# Patient Record
Sex: MALE | Race: WHITE | NOT HISPANIC OR LATINO | Employment: FULL TIME | URBAN - METROPOLITAN AREA
[De-identification: names, ages, dates, MRNs, and addresses within clinical notes are randomized per-mention and may not be internally consistent; named-entity substitution may affect disease eponyms.]

---

## 2017-12-01 ENCOUNTER — OFFICE VISIT (OUTPATIENT)
Dept: URGENT CARE | Facility: CLINIC | Age: 22
End: 2017-12-01
Payer: COMMERCIAL

## 2017-12-01 PROCEDURE — G0382 LEV 3 HOSP TYPE B ED VISIT: HCPCS

## 2017-12-01 PROCEDURE — 99283 EMERGENCY DEPT VISIT LOW MDM: CPT

## 2017-12-05 NOTE — PROGRESS NOTES
Assessment  1  Tracheobronchitis (490) (J40)    Plan  Tracheobronchitis    · DrRx Phenergan with Codeine 118 ML; 1 tsp every 4 hrs as needed   Disp 4 oz    Discussion/Summary  Discussion Summary:   4 to 6 glasses of water or juice per day  Vaporizer or humidifier the bedroom  Steam treatments with steam coming from the sink are very helpful  Robitussin DM for cough  Understands and agrees with treatment plan: The treatment plan was reviewed with the patient/guardian  The patient/guardian understands and agrees with the treatment plan   Counseling Documentation With Imm: The patient was counseled regarding instructions for management  Chief Complaint    1  Cough  Chief Complaint Free Text Note Form: The patient developed a cough one week ago  He was seen by his PCP and she prescribed cough medication  he reports he still has an occasional cough, most often at night and after activity  History of Present Illness  HPI: See CC   Hospital Based Practices Required Assessment:  Pain Assessment  the patient states they do not have pain  Reason DV Screen not done: family present   Depression And Suicide Screen  Reason suicide screen not done: family present  Review of Systems  Focused-Male:  ENT: as noted in HPI  Respiratory: as noted in HPI  Past Medical History  1  No pertinent past medical history  Active Problems And Past Medical History Reviewed: The active problems and past medical history were reviewed and updated today  Social History     · Never a smoker  Social History Reviewed: The social history was reviewed and updated today  Current Meds   1  Omeprazole 20 MG Oral Capsule Delayed Release; Therapy: (Recorded:[id number]) to Recorded    Allergies    1   Penicillins    Vitals  Signs   Recorded: [id number] 05:54PM   Temperature: 97 6 F  Heart Rate: 98  Respiration: 18  Systolic: 189  Diastolic: 70  Height: 5 ft 8 in  Weight: 285 lb   BMI Calculated: 43 33  BSA Calculated: 2 38  O2 Saturation: 98  Pain Scale: 0    Physical Exam   Eyes  Conjunctiva and lids: No swelling, erythema, or discharge  Ears, Nose, Mouth, and Throat  External inspection of ears and nose: Normal    Otoscopic examination: Tympanic membrance translucent with normal light reflex  Canals patent without erythema  Nasal mucosa, septum, and turbinates: Normal without edema or erythema  Oropharynx: Normal with no erythema, edema, exudate or lesions  Pulmonary  Auscultation of lungs: Clear to auscultation  Cardiovascular  Palpation of heart: Normal PMI, no thrills         Signatures   Electronically signed by : GHAZALA Ritter ; Dec  1 2017  6:13PM EST                       (Author)

## 2018-01-23 VITALS
HEIGHT: 68 IN | WEIGHT: 285 LBS | HEART RATE: 98 BPM | DIASTOLIC BLOOD PRESSURE: 70 MMHG | RESPIRATION RATE: 18 BRPM | SYSTOLIC BLOOD PRESSURE: 126 MMHG | BODY MASS INDEX: 43.19 KG/M2 | OXYGEN SATURATION: 98 % | TEMPERATURE: 97.6 F

## 2022-03-17 ENCOUNTER — HOSPITAL ENCOUNTER (EMERGENCY)
Facility: HOSPITAL | Age: 27
Discharge: HOME | End: 2022-03-17
Attending: EMERGENCY MEDICINE
Payer: COMMERCIAL

## 2022-03-17 ENCOUNTER — APPOINTMENT (EMERGENCY)
Dept: CT IMAGING | Facility: HOSPITAL | Age: 27
End: 2022-03-17
Payer: COMMERCIAL

## 2022-03-17 VITALS
WEIGHT: 315 LBS | HEIGHT: 68 IN | BODY MASS INDEX: 47.74 KG/M2 | TEMPERATURE: 97.8 F | RESPIRATION RATE: 16 BRPM | SYSTOLIC BLOOD PRESSURE: 118 MMHG | DIASTOLIC BLOOD PRESSURE: 61 MMHG | HEART RATE: 71 BPM | OXYGEN SATURATION: 99 %

## 2022-03-17 DIAGNOSIS — R19.7 DIARRHEA: ICD-10-CM

## 2022-03-17 DIAGNOSIS — R10.9 ABDOMINAL PAIN: Primary | ICD-10-CM

## 2022-03-17 LAB
ALBUMIN SERPL BCP-MCNC: 3.4 G/DL (ref 3.5–5)
ALP SERPL-CCNC: 128 U/L (ref 46–116)
ALT SERPL W P-5'-P-CCNC: 18 U/L (ref 12–78)
ANION GAP SERPL CALCULATED.3IONS-SCNC: 6 MMOL/L (ref 4–13)
AST SERPL W P-5'-P-CCNC: 15 U/L (ref 5–45)
BASOPHILS # BLD AUTO: 0.02 THOUSANDS/ΜL (ref 0–0.1)
BASOPHILS NFR BLD AUTO: 0 % (ref 0–1)
BILIRUB SERPL-MCNC: 0.9 MG/DL (ref 0.2–1)
BILIRUB UR QL STRIP: NEGATIVE
BUN SERPL-MCNC: 11 MG/DL (ref 5–25)
CALCIUM ALBUM COR SERPL-MCNC: 8.8 MG/DL (ref 8.3–10.1)
CALCIUM SERPL-MCNC: 8.3 MG/DL (ref 8.3–10.1)
CHLORIDE SERPL-SCNC: 103 MMOL/L (ref 100–108)
CLARITY UR: CLEAR
CO2 SERPL-SCNC: 27 MMOL/L (ref 21–32)
COLOR UR: YELLOW
CREAT SERPL-MCNC: 0.77 MG/DL (ref 0.6–1.3)
EOSINOPHIL # BLD AUTO: 0.14 THOUSAND/ΜL (ref 0–0.61)
EOSINOPHIL NFR BLD AUTO: 2 % (ref 0–6)
ERYTHROCYTE [DISTWIDTH] IN BLOOD BY AUTOMATED COUNT: 13.2 % (ref 11.6–15.1)
GFR SERPL CREATININE-BSD FRML MDRD: 126 ML/MIN/1.73SQ M
GLUCOSE SERPL-MCNC: 92 MG/DL (ref 65–140)
GLUCOSE UR STRIP-MCNC: NEGATIVE MG/DL
HCT VFR BLD AUTO: 42.7 % (ref 36.5–49.3)
HGB BLD-MCNC: 14 G/DL (ref 12–17)
HGB UR QL STRIP.AUTO: NEGATIVE
IMM GRANULOCYTES # BLD AUTO: 0.02 THOUSAND/UL (ref 0–0.2)
IMM GRANULOCYTES NFR BLD AUTO: 0 % (ref 0–2)
KETONES UR STRIP-MCNC: NEGATIVE MG/DL
LEUKOCYTE ESTERASE UR QL STRIP: NEGATIVE
LIPASE SERPL-CCNC: 64 U/L (ref 73–393)
LYMPHOCYTES # BLD AUTO: 1.42 THOUSANDS/ΜL (ref 0.6–4.47)
LYMPHOCYTES NFR BLD AUTO: 23 % (ref 14–44)
MCH RBC QN AUTO: 27 PG (ref 26.8–34.3)
MCHC RBC AUTO-ENTMCNC: 32.8 G/DL (ref 31.4–37.4)
MCV RBC AUTO: 82 FL (ref 82–98)
MONOCYTES # BLD AUTO: 0.59 THOUSAND/ΜL (ref 0.17–1.22)
MONOCYTES NFR BLD AUTO: 10 % (ref 4–12)
NEUTROPHILS # BLD AUTO: 3.87 THOUSANDS/ΜL (ref 1.85–7.62)
NEUTS SEG NFR BLD AUTO: 65 % (ref 43–75)
NITRITE UR QL STRIP: NEGATIVE
NRBC BLD AUTO-RTO: 0 /100 WBCS
PH UR STRIP.AUTO: 6 [PH]
PLATELET # BLD AUTO: 282 THOUSANDS/UL (ref 149–390)
PMV BLD AUTO: 10.7 FL (ref 8.9–12.7)
POTASSIUM SERPL-SCNC: 4 MMOL/L (ref 3.5–5.3)
PROT SERPL-MCNC: 7.2 G/DL (ref 6.4–8.2)
PROT UR STRIP-MCNC: NEGATIVE MG/DL
RBC # BLD AUTO: 5.18 MILLION/UL (ref 3.88–5.62)
SODIUM SERPL-SCNC: 136 MMOL/L (ref 136–145)
SP GR UR STRIP.AUTO: 1.01 (ref 1–1.03)
UROBILINOGEN UR QL STRIP.AUTO: 0.2 E.U./DL
WBC # BLD AUTO: 6.06 THOUSAND/UL (ref 4.31–10.16)

## 2022-03-17 PROCEDURE — 83690 ASSAY OF LIPASE: CPT | Performed by: EMERGENCY MEDICINE

## 2022-03-17 PROCEDURE — 99284 EMERGENCY DEPT VISIT MOD MDM: CPT

## 2022-03-17 PROCEDURE — 80053 COMPREHEN METABOLIC PANEL: CPT | Performed by: EMERGENCY MEDICINE

## 2022-03-17 PROCEDURE — 96374 THER/PROPH/DIAG INJ IV PUSH: CPT

## 2022-03-17 PROCEDURE — 96361 HYDRATE IV INFUSION ADD-ON: CPT

## 2022-03-17 PROCEDURE — 36415 COLL VENOUS BLD VENIPUNCTURE: CPT

## 2022-03-17 PROCEDURE — 81003 URINALYSIS AUTO W/O SCOPE: CPT | Performed by: PHYSICIAN ASSISTANT

## 2022-03-17 PROCEDURE — 85025 COMPLETE CBC W/AUTO DIFF WBC: CPT | Performed by: EMERGENCY MEDICINE

## 2022-03-17 PROCEDURE — 74177 CT ABD & PELVIS W/CONTRAST: CPT

## 2022-03-17 PROCEDURE — 99284 EMERGENCY DEPT VISIT MOD MDM: CPT | Performed by: PHYSICIAN ASSISTANT

## 2022-03-17 RX ORDER — KETOROLAC TROMETHAMINE 30 MG/ML
30 INJECTION, SOLUTION INTRAMUSCULAR; INTRAVENOUS ONCE
Status: COMPLETED | OUTPATIENT
Start: 2022-03-17 | End: 2022-03-17

## 2022-03-17 RX ORDER — NAPROXEN 500 MG/1
500 TABLET ORAL
COMMUNITY
Start: 2022-02-24

## 2022-03-17 RX ADMIN — SODIUM CHLORIDE 1000 ML: 0.9 INJECTION, SOLUTION INTRAVENOUS at 17:25

## 2022-03-17 RX ADMIN — KETOROLAC TROMETHAMINE 30 MG: 30 INJECTION, SOLUTION INTRAMUSCULAR at 18:54

## 2022-03-17 RX ADMIN — IOHEXOL 100 ML: 350 INJECTION, SOLUTION INTRAVENOUS at 17:30

## 2022-03-17 NOTE — ED PROVIDER NOTES
History  Chief Complaint   Patient presents with    Abdominal Pain     Pt reports abd pain since yesterday, +diarrhea since yesterday, has not taken any medications prior to arrival, denies n/v  Denies trauma  Patient is a 44-year-old white male with history of abdominal  hernia repair November 2021 who reports onset yesterday of abdominal pain, diarrhea, decreased appetite  No fever  No nausea or vomiting  No urinary symptoms  No back pain or flank pain  No recent sick contacts, foreign travel, antibiotics  No other complaints  Prior to Admission Medications   Prescriptions Last Dose Informant Patient Reported? Taking? Omeprazole 20 MG TBDD   Yes No   Sig: Take 20 mg by mouth   naproxen (NAPROSYN) 500 mg tablet   Yes Yes   Sig: Take 500 mg by mouth      Facility-Administered Medications: None       History reviewed  No pertinent past medical history  Past Surgical History:   Procedure Laterality Date    HERNIA REPAIR         History reviewed  No pertinent family history  I have reviewed and agree with the history as documented  E-Cigarette/Vaping    E-Cigarette Use Never User      E-Cigarette/Vaping Substances    Nicotine No     THC No     CBD No     Flavoring No     Other No     Unknown No      Social History     Tobacco Use    Smoking status: Former Smoker    Smokeless tobacco: Never Used   Vaping Use    Vaping Use: Never used   Substance Use Topics    Alcohol use: Yes     Comment: rare    Drug use: Never       Review of Systems   Constitutional: Negative for chills and fever  HENT: Negative for sore throat  Respiratory: Negative for cough and shortness of breath  Cardiovascular: Negative for chest pain and palpitations  Gastrointestinal: Positive for abdominal pain and diarrhea  Negative for constipation, nausea and vomiting  Genitourinary: Negative for dysuria and hematuria  Musculoskeletal: Negative for arthralgias, back pain and neck pain     Skin: Negative for rash  Neurological: Negative for syncope and headaches  All other systems reviewed and are negative  Physical Exam  Physical Exam  Vitals and nursing note reviewed  Constitutional:       General: He is not in acute distress  Appearance: He is well-developed  He is obese  He is not ill-appearing, toxic-appearing or diaphoretic  HENT:      Head: Normocephalic and atraumatic  Mouth/Throat:      Mouth: Mucous membranes are moist       Pharynx: Oropharynx is clear  Eyes:      Extraocular Movements: Extraocular movements intact  Pupils: Pupils are equal, round, and reactive to light  Cardiovascular:      Rate and Rhythm: Normal rate and regular rhythm  Heart sounds: Normal heart sounds  Pulmonary:      Effort: Pulmonary effort is normal       Breath sounds: Normal breath sounds  Abdominal:      General: Bowel sounds are normal       Palpations: Abdomen is soft  Comments: No umbilical swelling, redness, drainage  Tenderness to deep palpation epigastrium to left mid abdomen  Negative Noguera sign  No tenderness over McBurney's point  Skin:     General: Skin is warm and dry  Capillary Refill: Capillary refill takes less than 2 seconds  Neurological:      Mental Status: He is alert           Vital Signs  ED Triage Vitals [03/17/22 1215]   Temperature Pulse Respirations Blood Pressure SpO2   97 8 °F (36 6 °C) 68 16 114/59 100 %      Temp Source Heart Rate Source Patient Position - Orthostatic VS BP Location FiO2 (%)   Temporal Monitor Sitting Left arm --      Pain Score       8           Vitals:    03/17/22 1215 03/17/22 1359 03/17/22 1624   BP: 114/59 110/55 118/61   Pulse: 68 66 71   Patient Position - Orthostatic VS: Sitting Sitting Sitting         Visual Acuity      ED Medications  Medications   sodium chloride 0 9 % bolus 1,000 mL (0 mL Intravenous Stopped 3/17/22 1825)   iohexol (OMNIPAQUE) 350 MG/ML injection (SINGLE-DOSE) 100 mL (100 mL Intravenous Given 3/17/22 1730)   ketorolac (TORADOL) injection 30 mg (30 mg Intravenous Given 3/17/22 1854)       Diagnostic Studies  Results Reviewed     Procedure Component Value Units Date/Time    UA w Reflex to Microscopic w Reflex to Culture [[id number]] Collected: 03/17/22 1854    Lab Status: Final result Specimen: Urine, Clean Catch Updated: 03/17/22 1901     Color, UA Yellow     Clarity, UA Clear     Specific Gravity, UA 1 010     pH, UA 6 0     Leukocytes, UA Negative     Nitrite, UA Negative     Protein, UA Negative mg/dl      Glucose, UA Negative mg/dl      Ketones, UA Negative mg/dl      Urobilinogen, UA 0 2 E U /dl      Bilirubin, UA Negative     Blood, UA Negative    Comprehensive metabolic panel [[id number]]  (Abnormal) Collected: 03/17/22 1234    Lab Status: Final result Specimen: Blood from Arm, Right Updated: 03/17/22 1331     Sodium 136 mmol/L      Potassium 4 0 mmol/L      Chloride 103 mmol/L      CO2 27 mmol/L      ANION GAP 6 mmol/L      BUN 11 mg/dL      Creatinine 0 77 mg/dL      Glucose 92 mg/dL      Calcium 8 3 mg/dL      Corrected Calcium 8 8 mg/dL      AST 15 U/L      ALT 18 U/L      Alkaline Phosphatase 128 U/L      Total Protein 7 2 g/dL      Albumin 3 4 g/dL      Total Bilirubin 0 90 mg/dL      eGFR 126 ml/min/1 73sq m     Narrative:      [facility] guidelines for Chronic Kidney Disease (CKD):     Stage 1 with normal or high GFR (GFR > 90 mL/min/1 73 square meters)    Stage 2 Mild CKD (GFR = 60-89 mL/min/1 73 square meters)    Stage 3A Moderate CKD (GFR = 45-59 mL/min/1 73 square meters)    Stage 3B Moderate CKD (GFR = 30-44 mL/min/1 73 square meters)    Stage 4 Severe CKD (GFR = 15-29 mL/min/1 73 square meters)    Stage 5 End Stage CKD (GFR <15 mL/min/1 73 square meters)  Note: GFR calculation is accurate only with a steady state creatinine    Lipase [[id number]]  (Abnormal) Collected: 03/17/22 1234    Lab Status: Final result Specimen: Blood from Arm, Right Updated: 03/17/22 1331     Lipase 64 u/L     CBC and differential [[id number]] Collected: 03/17/22 1234    Lab Status: Final result Specimen: Blood from Arm, Right Updated: 03/17/22 1242     WBC 6 06 Thousand/uL      RBC 5 18 Million/uL      Hemoglobin 14 0 g/dL      Hematocrit 42 7 %      MCV 82 fL      MCH 27 0 pg      MCHC 32 8 g/dL      RDW 13 2 %      MPV 10 7 fL      Platelets 330 Thousands/uL      nRBC 0 /100 WBCs      Neutrophils Relative 65 %      Immat GRANS % 0 %      Lymphocytes Relative 23 %      Monocytes Relative 10 %      Eosinophils Relative 2 %      Basophils Relative 0 %      Neutrophils Absolute 3 87 Thousands/µL      Immature Grans Absolute 0 02 Thousand/uL      Lymphocytes Absolute 1 42 Thousands/µL      Monocytes Absolute 0 59 Thousand/µL      Eosinophils Absolute 0 14 Thousand/µL      Basophils Absolute 0 02 Thousands/µL                  CT abdomen pelvis with contrast   Final Result by Geri Mcginnis DO (03/17 1820)   Nonspecific hepatosplenomegaly  No CT evidence for colitis or diverticulitis  Prominent but not pathologically enlarged mesenteric nodes can be seen in the setting of enteritis  Workstation performed: [id number]                    Procedures  Procedures         ED Course                               SBIRT 20yo+      Most Recent Value   SBIRT (22 yo +)    In order to provide better care to our patients, we are screening all of our patients for alcohol and drug use  Would it be okay to ask you these screening questions? Yes Filed at: 03/17/2022 1233   Initial Alcohol Screen: US AUDIT-C     1  How often do you have a drink containing alcohol? 1 Filed at: 03/17/2022 1233   2  How many drinks containing alcohol do you have on a typical day you are drinking? 1 Filed at: 03/17/2022 1233   3a  Male UNDER 65: How often do you have five or more drinks on one occasion? 0 Filed at: 03/17/2022 1233   3b  FEMALE Any Age, or MALE 65+:  How often do you have 4 or more drinks on one occassion? 0 Filed at: 03/17/2022 1233   Audit-C Score 2 Filed at: 03/17/2022 1233   BRIONNA: How many times in the past year have you    Used an illegal drug or used a prescription medication for non-medical reasons? Never Filed at: 03/17/2022 1233                    [facility]  Number of Diagnoses or Management Options  Abdominal pain: new and requires workup  Diarrhea: new and requires workup  Diagnosis management comments: 29-year-old white male with 1 day history of abdominal pain and diarrhea  Blood work is normal   No acute cause found on CT scan  Abdominal exam is benign at time of discharge  Suspect enteritis likely from viral etiology  Amount and/or Complexity of Data Reviewed  Clinical lab tests: reviewed  Tests in the radiology section of CPT®: reviewed  Tests in the medicine section of CPT®: reviewed  Decide to obtain previous medical records or to obtain history from someone other than the patient: yes  Review and summarize past medical records: yes  Independent visualization of images, tracings, or specimens: yes    Risk of Complications, Morbidity, and/or Mortality  Presenting problems: moderate  Diagnostic procedures: moderate  Management options: moderate    Patient Progress  Patient progress: stable      Disposition  Final diagnoses:   Abdominal pain   Diarrhea     Time reflects when diagnosis was documented in both MDM as applicable and the Disposition within this note     Time User Action Codes Description Comment    3/17/2022  7:07 PM Thresea Quails Add [R10 9] Abdominal pain     3/17/2022  7:07 PM Thresea Quails Add [R19 7] Diarrhea       ED Disposition     ED Disposition Condition Date/Time Comment    Discharge Stable Thu Mar 17, 2022  7:07 PM [employer] [facility] discharge to home/self care              Follow-up Information     Follow up With Specialties Details Why Contact Info    Bryon Crane MD    [address]  [facility] [address]  [phone] Patient's Medications   Discharge Prescriptions    No medications on file       No discharge procedures on file      PDMP Review     None          ED Provider  Electronically Signed by           Akanksha Zaidi PA-C  03/17/22 [address] [place], [place] [address]JEANINE  03/17/22 1675

## 2022-03-17 NOTE — DISCHARGE INSTRUCTIONS
Encourage fluids  May take Imodium over-the-counter for diarrhea  Follow-up with your primary care provider next 2-3 days for recheck if no improvement      There was was no acute cause for your abdominal pain found on CT scan    Return to the ED for high fever, worsening pain or symptoms

## 2022-11-04 ENCOUNTER — HOSPITAL ENCOUNTER (EMERGENCY)
Facility: HOSPITAL | Age: 27
Discharge: HOME | End: 2022-11-05
Attending: EMERGENCY MEDICINE | Admitting: EMERGENCY MEDICINE

## 2022-11-04 DIAGNOSIS — R10.9 ABDOMINAL PAIN: Primary | ICD-10-CM

## 2022-11-04 DIAGNOSIS — I88.0 MESENTERIC ADENITIS: ICD-10-CM

## 2022-11-04 LAB
ALBUMIN SERPL BCP-MCNC: 3.7 G/DL (ref 3.5–5)
ALP SERPL-CCNC: 136 U/L (ref 46–116)
ALT SERPL W P-5'-P-CCNC: 24 U/L (ref 12–78)
ANION GAP SERPL CALCULATED.3IONS-SCNC: 8 MMOL/L (ref 4–13)
AST SERPL W P-5'-P-CCNC: 24 U/L (ref 5–45)
BACTERIA UR QL AUTO: ABNORMAL /HPF
BASOPHILS # BLD AUTO: 0.05 THOUSANDS/ÂΜL (ref 0–0.1)
BASOPHILS NFR BLD AUTO: 1 % (ref 0–1)
BILIRUB SERPL-MCNC: 1.2 MG/DL (ref 0.2–1)
BILIRUB UR QL STRIP: NEGATIVE
BUN SERPL-MCNC: 14 MG/DL (ref 5–25)
CALCIUM SERPL-MCNC: 8.8 MG/DL (ref 8.3–10.1)
CHLORIDE SERPL-SCNC: 104 MMOL/L (ref 96–108)
CLARITY UR: CLEAR
CO2 SERPL-SCNC: 27 MMOL/L (ref 21–32)
COLOR UR: YELLOW
CREAT SERPL-MCNC: 0.97 MG/DL (ref 0.6–1.3)
EOSINOPHIL # BLD AUTO: 0.21 THOUSAND/ÂΜL (ref 0–0.61)
EOSINOPHIL NFR BLD AUTO: 3 % (ref 0–6)
ERYTHROCYTE [DISTWIDTH] IN BLOOD BY AUTOMATED COUNT: 13 % (ref 11.6–15.1)
GFR SERPL CREATININE-BSD FRML MDRD: 108 ML/MIN/1.73SQ M
GLUCOSE SERPL-MCNC: 94 MG/DL (ref 65–140)
GLUCOSE UR STRIP-MCNC: NEGATIVE MG/DL
HCT VFR BLD AUTO: 42.4 % (ref 36.5–49.3)
HGB BLD-MCNC: 13.8 G/DL (ref 12–17)
HGB UR QL STRIP.AUTO: NEGATIVE
IMM GRANULOCYTES # BLD AUTO: 0.02 THOUSAND/UL (ref 0–0.2)
IMM GRANULOCYTES NFR BLD AUTO: 0 % (ref 0–2)
KETONES UR STRIP-MCNC: NEGATIVE MG/DL
LEUKOCYTE ESTERASE UR QL STRIP: ABNORMAL
LIPASE SERPL-CCNC: 76 U/L (ref 73–393)
LYMPHOCYTES # BLD AUTO: 2.11 THOUSANDS/ÂΜL (ref 0.6–4.47)
LYMPHOCYTES NFR BLD AUTO: 30 % (ref 14–44)
MCH RBC QN AUTO: 27.4 PG (ref 26.8–34.3)
MCHC RBC AUTO-ENTMCNC: 32.5 G/DL (ref 31.4–37.4)
MCV RBC AUTO: 84 FL (ref 82–98)
MONOCYTES # BLD AUTO: 0.71 THOUSAND/ÂΜL (ref 0.17–1.22)
MONOCYTES NFR BLD AUTO: 10 % (ref 4–12)
MUCOUS THREADS UR QL AUTO: ABNORMAL
NEUTROPHILS # BLD AUTO: 4 THOUSANDS/ÂΜL (ref 1.85–7.62)
NEUTS SEG NFR BLD AUTO: 56 % (ref 43–75)
NITRITE UR QL STRIP: NEGATIVE
NON-SQ EPI CELLS URNS QL MICRO: ABNORMAL /HPF
NRBC BLD AUTO-RTO: 0 /100 WBCS
PH UR STRIP.AUTO: 6 [PH]
PLATELET # BLD AUTO: 323 THOUSANDS/UL (ref 149–390)
PMV BLD AUTO: 10.6 FL (ref 8.9–12.7)
POTASSIUM SERPL-SCNC: 3.8 MMOL/L (ref 3.5–5.3)
PROT SERPL-MCNC: 8.1 G/DL (ref 6.4–8.4)
PROT UR STRIP-MCNC: ABNORMAL MG/DL
RBC # BLD AUTO: 5.03 MILLION/UL (ref 3.88–5.62)
RBC #/AREA URNS AUTO: ABNORMAL /HPF
SODIUM SERPL-SCNC: 139 MMOL/L (ref 135–147)
SP GR UR STRIP.AUTO: >=1.03 (ref 1–1.03)
UROBILINOGEN UR STRIP-ACNC: 4 MG/DL
WBC # BLD AUTO: 7.1 THOUSAND/UL (ref 4.31–10.16)
WBC #/AREA URNS AUTO: ABNORMAL /HPF

## 2022-11-04 RX ADMIN — SODIUM CHLORIDE 80 MG: 9 INJECTION, SOLUTION INTRAVENOUS at 23:38

## 2022-11-05 ENCOUNTER — APPOINTMENT (EMERGENCY)
Dept: CT IMAGING | Facility: HOSPITAL | Age: 27
End: 2022-11-05

## 2022-11-05 VITALS
SYSTOLIC BLOOD PRESSURE: 105 MMHG | OXYGEN SATURATION: 93 % | TEMPERATURE: 97.5 F | WEIGHT: 315 LBS | DIASTOLIC BLOOD PRESSURE: 63 MMHG | HEART RATE: 69 BPM | RESPIRATION RATE: 20 BRPM | BODY MASS INDEX: 47.74 KG/M2 | HEIGHT: 68 IN

## 2022-11-05 LAB
FLUAV RNA RESP QL NAA+PROBE: NEGATIVE
FLUBV RNA RESP QL NAA+PROBE: NEGATIVE
RSV RNA RESP QL NAA+PROBE: NEGATIVE
SARS-COV-2 RNA RESP QL NAA+PROBE: NEGATIVE

## 2022-11-05 RX ORDER — KETOROLAC TROMETHAMINE 30 MG/ML
15 INJECTION, SOLUTION INTRAMUSCULAR; INTRAVENOUS ONCE
Status: COMPLETED | OUTPATIENT
Start: 2022-11-05 | End: 2022-11-05

## 2022-11-05 RX ADMIN — IOHEXOL 100 ML: 350 INJECTION, SOLUTION INTRAVENOUS at 00:45

## 2022-11-05 RX ADMIN — KETOROLAC TROMETHAMINE 15 MG: 30 INJECTION, SOLUTION INTRAMUSCULAR; INTRAVENOUS at 00:03

## 2022-11-05 NOTE — ED PROVIDER NOTES
History  Chief Complaint   Patient presents with   • Abdominal Pain     Pt states that he started with abd pain on Tuesday  Pt denies taking any OTC meds for the pain  Pt states that he ate pizza tonight and the pain came back  Pt denies N/V/D       24year old male presents for evaluation of epigastric abdominal pain which began at 6:30 pm today after eating pizza  Pain is epigastric, sharp and severe  No exacerbating or alleviating factors  He has not taken anything for pain prior to arrival  Patient states that he had similar symptoms 4 days ago associated with diarrhea, myalgias and chills  Mild cough  No fevers  He took otc medication for diarrhea with resolution of symptoms prior to onset of symptoms today  Sick contacts at home  Normal bowel movement 4 hours ago  History of prior hernia repairs  Patient has chronic back pain for which he takes naproxen daily  History provided by:  Patient  Abdominal Pain  Pain location:  Epigastric  Pain quality: sharp    Pain radiates to:  Does not radiate  Pain severity:  Severe  Onset quality:  Sudden  Duration:  5 days  Timing:  Constant  Progression:  Unchanged  Chronicity:  New  Relieved by:  None tried  Worsened by:  Nothing  Ineffective treatments:  None tried  Associated symptoms: cough    Associated symptoms: no chest pain, no chills, no constipation, no diarrhea (4 days ago, resolved), no dysuria, no fever, no nausea, no shortness of breath and no vomiting    Cough:     Cough characteristics:  Non-productive    Severity:  Mild  Risk factors: NSAID use    Risk factors: has not had multiple surgeries (prior hernia repair)        Prior to Admission Medications   Prescriptions Last Dose Informant Patient Reported? Taking?    Omeprazole 20 MG TBDD   Yes No   Sig: Take 20 mg by mouth   Omeprazole 20 MG TBDD   No Yes   Sig: Take 20 mg by mouth in the morning   naproxen (NAPROSYN) 500 mg tablet   Yes No   Sig: Take 500 mg by mouth      Facility-Administered Medications: None       Past Medical History:   Diagnosis Date   • Hernia, abdominal    • Sleep apnea        Past Surgical History:   Procedure Laterality Date   • HERNIA REPAIR         No family history on file  I have reviewed and agree with the history as documented  E-Cigarette/Vaping   • E-Cigarette Use Never User      E-Cigarette/Vaping Substances   • Nicotine No    • THC No    • CBD No    • Flavoring No    • Other No    • Unknown No      Social History     Tobacco Use   • Smoking status: Former Smoker     Types: Cigarettes   • Smokeless tobacco: Never Used   Vaping Use   • Vaping Use: Never used   Substance Use Topics   • Alcohol use: Yes     Comment: rare   • Drug use: Never       Review of Systems   Constitutional: Negative for chills and fever  HENT: Negative for congestion  Respiratory: Positive for cough  Negative for shortness of breath  Cardiovascular: Negative for chest pain  Gastrointestinal: Positive for abdominal pain  Negative for constipation, diarrhea (4 days ago, resolved), nausea and vomiting  Genitourinary: Negative for dysuria and frequency  Musculoskeletal: Positive for gait problem (chronic, unchanged from baseline) and myalgias  Skin: Negative for rash and wound  Neurological: Negative for headaches  All other systems reviewed and are negative  Physical Exam  Physical Exam  Vitals and nursing note reviewed  Constitutional:       General: He is not in acute distress  Appearance: He is well-developed  He is not toxic-appearing or diaphoretic  HENT:      Head: Normocephalic and atraumatic  Right Ear: External ear normal       Left Ear: External ear normal       Nose: Nose normal    Eyes:      General: No scleral icterus  Cardiovascular:      Rate and Rhythm: Normal rate and regular rhythm  Heart sounds: Normal heart sounds  Pulmonary:      Effort: Pulmonary effort is normal  No respiratory distress  Breath sounds: Normal breath sounds  Abdominal:      General: There is no distension  Palpations: Abdomen is soft  Tenderness: There is abdominal tenderness in the right lower quadrant and epigastric area  There is no guarding or rebound  Musculoskeletal:         General: No deformity  Normal range of motion  Skin:     Findings: No rash  Neurological:      General: No focal deficit present  Mental Status: He is alert  Gait: Gait normal    Psychiatric:         Mood and Affect: Mood normal          Vital Signs  ED Triage Vitals [11/04/22 2155]   Temperature Pulse Respirations Blood Pressure SpO2   97 5 °F (36 4 °C) 77 20 124/71 97 %      Temp Source Heart Rate Source Patient Position - Orthostatic VS BP Location FiO2 (%)   Temporal -- -- -- --      Pain Score       --           Vitals:    11/04/22 2155 11/05/22 0000 11/05/22 0030   BP: 124/71 110/51 105/63   Pulse: 77 81 69         Visual Acuity      ED Medications  Medications   pantoprazole (PROTONIX) 80 mg in sodium chloride 0 9 % 100 mL IVPB (0 mg Intravenous Stopped 11/5/22 0004)   ketorolac (TORADOL) injection 15 mg (15 mg Intravenous Given 11/5/22 0003)   iohexol (OMNIPAQUE) 350 MG/ML injection (SINGLE-DOSE) 100 mL (100 mL Intravenous Given 11/5/22 0045)       Diagnostic Studies  Results Reviewed     Procedure Component Value Units Date/Time    FLU/RSV/COVID - if FLU/RSV clinically relevant [[id number]]  (Normal) Collected: 11/04/22 2341    Lab Status: Final result Specimen: Nares from Nose Updated: 11/05/22 0021     SARS-CoV-2 Negative     INFLUENZA A PCR Negative     INFLUENZA B PCR Negative     RSV PCR Negative    Narrative:      FOR PEDIATRIC PATIENTS - copy/paste COVID Guidelines URL to browser: [email or web address]  [email or web address]    SARS-CoV-2 assay is a Nucleic Acid Amplification assay intended for the  qualitative detection of nucleic acid from SARS-CoV-2 in nasopharyngeal  swabs   Results are for the presumptive identification of SARS-CoV-2 RNA  Positive results are indicative of infection with SARS-CoV-2, the virus  causing COVID-19, but do not rule out bacterial infection or co-infection  with other viruses  Laboratories within the [place] and its  territories are required to report all positive results to the appropriate  public health authorities  Negative results do not preclude SARS-CoV-2  infection and should not be used as the sole basis for treatment or other  patient management decisions  Negative results must be combined with  clinical observations, patient history, and epidemiological information  This test has not been FDA cleared or approved  This test has been authorized by FDA under an Emergency Use Authorization  (EUA)  This test is only authorized for the duration of time the  declaration that circumstances exist justifying the authorization of the  emergency use of an in vitro diagnostic tests for detection of SARS-CoV-2  virus and/or diagnosis of COVID-19 infection under section 564(b)(1) of  the Act, 21 U  S C  190LUQ-5(D)(1), unless the authorization is terminated  or revoked sooner  The test has been validated but independent review by FDA  and CLIA is pending  Test performed using [employer] GeneXpert: This RT-PCR assay targets N2,  a region unique to SARS-CoV-2  A conserved region in the E-gene was chosen  for pan-Sarbecovirus detection which includes SARS-CoV-2  According to CMS-2020-01-R, this platform meets the definition of high-throughput technology  Urine Microscopic [[id number]]  (Abnormal) Collected: 11/04/22 2203    Lab Status: Final result Specimen: Urine, Clean Catch Updated: 11/04/22 2233     RBC, UA 0-1 /hpf      WBC, UA 10-20 /hpf      Epithelial Cells Occasional /hpf      Bacteria, UA None Seen /hpf      MUCUS THREADS Occasional    Urine culture [[id number]] Collected: 11/04/22 2203    Lab Status:  In process Specimen: Urine, Clean Catch Updated: 11/04/22 2233    UA w Reflex to Microscopic w Reflex to Culture [[id number]]  (Abnormal) Collected: 11/04/22 2203    Lab Status: Final result Specimen: Urine, Clean Catch Updated: 11/04/22 2225     Color, UA Yellow     Clarity, UA Clear     Specific Gravity, UA >=1 030     pH, UA 6 0     Leukocytes, UA Trace     Nitrite, UA Negative     Protein, UA Trace mg/dl      Glucose, UA Negative mg/dl      Ketones, UA Negative mg/dl      Urobilinogen, UA 4 0 mg/dl      Bilirubin, UA Negative     Occult Blood, UA Negative    Comprehensive metabolic panel [[id number]]  (Abnormal) Collected: 11/04/22 2159    Lab Status: Final result Specimen: Blood from Arm, Right Updated: 11/04/22 2221     Sodium 139 mmol/L      Potassium 3 8 mmol/L      Chloride 104 mmol/L      CO2 27 mmol/L      ANION GAP 8 mmol/L      BUN 14 mg/dL      Creatinine 0 97 mg/dL      Glucose 94 mg/dL      Calcium 8 8 mg/dL      AST 24 U/L      ALT 24 U/L      Alkaline Phosphatase 136 U/L      Total Protein 8 1 g/dL      Albumin 3 7 g/dL      Total Bilirubin 1 20 mg/dL      eGFR 108 ml/min/1 73sq m     Narrative:      [place] guidelines for Chronic Kidney Disease (CKD):   •  Stage 1 with normal or high GFR (GFR > 90 mL/min/1 73 square meters)  •  Stage 2 Mild CKD (GFR = 60-89 mL/min/1 73 square meters)  •  Stage 3A Moderate CKD (GFR = 45-59 mL/min/1 73 square meters)  •  Stage 3B Moderate CKD (GFR = 30-44 mL/min/1 73 square meters)  •  Stage 4 Severe CKD (GFR = 15-29 mL/min/1 73 square meters)  •  Stage 5 End Stage CKD (GFR <15 mL/min/1 73 square meters)  Note: GFR calculation is accurate only with a steady state creatinine    Lipase [[id number]]  (Normal) Collected: 11/04/22 2159    Lab Status: Final result Specimen: Blood from Arm, Right Updated: 11/04/22 2221     Lipase 76 u/L     CBC and differential [[id number]] Collected: 11/04/22 2159    Lab Status: Final result Specimen: Blood from Arm, Right Updated: 11/04/22 2208     WBC 7 10 Thousand/uL      RBC 5 03 Million/uL      Hemoglobin 13 8 g/dL      Hematocrit 42 4 %      MCV 84 fL      MCH 27 4 pg      MCHC 32 5 g/dL      RDW 13 0 %      MPV 10 6 fL      Platelets 545 Thousands/uL      nRBC 0 /100 WBCs      Neutrophils Relative 56 %      Immat GRANS % 0 %      Lymphocytes Relative 30 %      Monocytes Relative 10 %      Eosinophils Relative 3 %      Basophils Relative 1 %      Neutrophils Absolute 4 00 Thousands/µL      Immature Grans Absolute 0 02 Thousand/uL      Lymphocytes Absolute 2 11 Thousands/µL      Monocytes Absolute 0 71 Thousand/µL      Eosinophils Absolute 0 21 Thousand/µL      Basophils Absolute 0 05 Thousands/µL                  CT abdomen pelvis with contrast   Final Result by Mary Burger MD (11/05 0149)      1  No evidence of appendicitis  2   Small mesenteric lymph nodes are increased in number, nonspecific however may be seen in setting of mesenteric adenitis  Workstation performed: [id number]                    Procedures  Procedures         ED Course                               SBIRT 22yo+    Flowsheet Row Most Recent Value   SBIRT (25 yo +)    In order to provide better care to our patients, we are screening all of our patients for alcohol and drug use  Would it be okay to ask you these screening questions? Yes Filed at: 11/05/2022 0009   Initial Alcohol Screen: US AUDIT-C     1  How often do you have a drink containing alcohol? 1 Filed at: 11/05/2022 0009   2  How many drinks containing alcohol do you have on a typical day you are drinking? 0 Filed at: 11/05/2022 0009   3a  Male UNDER 65: How often do you have five or more drinks on one occasion? 0 Filed at: 11/05/2022 0009   3b  FEMALE Any Age, or MALE 65+: How often do you have 4 or more drinks on one occassion? 0 Filed at: 11/05/2022 0009   Audit-C Score 1 Filed at: 11/05/2022 0009   BRIONNA: How many times in the past year have you    Used an illegal drug or used a prescription medication for non-medical reasons?  Never Annita So at: 11/05/2022 0009                    MDM  Number of Diagnoses or Management Options  Abdominal pain: new and requires workup  Mesenteric adenitis: new and requires workup  Diagnosis management comments: 24year old male presents for evaluation of abdominal pain  Pyuria on UA with no symptoms of UTI  RLQ tenderness on exam  Labs unremarkable  CT abd/pelvis ordered which is consistent with mesenteric adenitis  NSAIDs  Patient's omeprazole re-prescribed as suspect element of gastritis  PCP follow up  Return precautions provided  Amount and/or Complexity of Data Reviewed  Clinical lab tests: ordered and reviewed  Tests in the radiology section of CPT®: ordered and reviewed    Patient Progress  Patient progress: stable      Disposition  Final diagnoses:   Abdominal pain   Mesenteric adenitis     Time reflects when diagnosis was documented in both MDM as applicable and the Disposition within this note     Time User Action Codes Description Comment    11/5/2022  1:21 AM Kimberlyn LAKHANI Add [R10 9] Abdominal pain     11/5/2022  1:57 AM Socorro Holden Add [I88 0] Mesenteric adenitis       ED Disposition     ED Disposition   Discharge    Condition   Stable    Date/Time   Sat Nov 5, 2022  1:57 AM    Comment   Matthew Kearns discharge to home/self care                 Follow-up Information     Follow up With Specialties Details Why Contact Info Additional Information    Denise Norris MD  Schedule an appointment as soon as possible for a visit in 3 days for re-evaluation [place] [address] Emergency Medicine Go to  If symptoms worsen [address] [place],[phone]  [address] [facility], [address] [place] [address] [place] [address]          Discharge Medication List as of 11/5/2022  1:57 AM      CONTINUE these medications which have CHANGED    Details   Omeprazole 20 MG TBDD Take 20 mg by mouth in the morning, Starting Sat 11/5/2022, Normal         CONTINUE these medications which have NOT CHANGED    Details   naproxen (NAPROSYN) 500 mg tablet Take 500 mg by mouth, Starting Thu 2/24/2022, Historical Med             No discharge procedures on file      PDMP Review     None          ED Provider  Electronically Signed by           Pool Means MD  11/05/22 1935

## 2022-11-05 NOTE — DISCHARGE INSTRUCTIONS
Please follow-up with the primary care provider for further care, if symptoms worsen please return to emergency department may take Tylenol and Motrin as needed for symptom control    Given your history of GERD and current epigastric discomfort please use NSAIDs such as Motrin cautiously as they may exacerbate the symptoms    Please take your PPI as prescribed for symptomatic relief

## 2022-11-07 LAB — BACTERIA UR CULT: NORMAL

## 2023-05-17 VITALS
TEMPERATURE: 97.8 F | SYSTOLIC BLOOD PRESSURE: 130 MMHG | DIASTOLIC BLOOD PRESSURE: 91 MMHG | BODY MASS INDEX: 47.74 KG/M2 | OXYGEN SATURATION: 99 % | HEIGHT: 68 IN | WEIGHT: 315 LBS | HEART RATE: 83 BPM | RESPIRATION RATE: 20 BRPM

## 2023-05-18 ENCOUNTER — HOSPITAL ENCOUNTER (EMERGENCY)
Facility: HOSPITAL | Age: 28
Discharge: HOME | End: 2023-05-18
Attending: EMERGENCY MEDICINE

## 2023-05-18 ENCOUNTER — APPOINTMENT (EMERGENCY)
Dept: CT IMAGING | Facility: HOSPITAL | Age: 28
End: 2023-05-18

## 2023-05-18 DIAGNOSIS — K42.9 UMBILICAL HERNIA: Primary | ICD-10-CM

## 2023-05-18 DIAGNOSIS — R16.1 SPLENOMEGALY: ICD-10-CM

## 2023-05-18 DIAGNOSIS — D72.829 LEUKOCYTOSIS: ICD-10-CM

## 2023-05-18 DIAGNOSIS — L03.90 CELLULITIS: ICD-10-CM

## 2023-05-18 DIAGNOSIS — R16.0 HEPATOMEGALY: ICD-10-CM

## 2023-05-18 LAB
ALBUMIN SERPL BCP-MCNC: 4.1 G/DL (ref 3.5–5)
ALP SERPL-CCNC: 142 U/L (ref 34–104)
ALT SERPL W P-5'-P-CCNC: 15 U/L (ref 7–52)
ANION GAP SERPL CALCULATED.3IONS-SCNC: 5 MMOL/L (ref 4–13)
AST SERPL W P-5'-P-CCNC: 19 U/L (ref 13–39)
BASOPHILS # BLD AUTO: 0.08 THOUSANDS/ÂΜL (ref 0–0.1)
BASOPHILS NFR BLD AUTO: 1 % (ref 0–1)
BILIRUB SERPL-MCNC: 0.73 MG/DL (ref 0.2–1)
BUN SERPL-MCNC: 16 MG/DL (ref 5–25)
CALCIUM SERPL-MCNC: 9.4 MG/DL (ref 8.4–10.2)
CHLORIDE SERPL-SCNC: 104 MMOL/L (ref 96–108)
CO2 SERPL-SCNC: 29 MMOL/L (ref 21–32)
CREAT SERPL-MCNC: 0.81 MG/DL (ref 0.6–1.3)
EOSINOPHIL # BLD AUTO: 0.51 THOUSAND/ÂΜL (ref 0–0.61)
EOSINOPHIL NFR BLD AUTO: 5 % (ref 0–6)
ERYTHROCYTE [DISTWIDTH] IN BLOOD BY AUTOMATED COUNT: 13.7 % (ref 11.6–15.1)
GFR SERPL CREATININE-BSD FRML MDRD: 122 ML/MIN/1.73SQ M
GLUCOSE SERPL-MCNC: 109 MG/DL (ref 65–140)
HCT VFR BLD AUTO: 42.8 % (ref 36.5–49.3)
HGB BLD-MCNC: 13.7 G/DL (ref 12–17)
IMM GRANULOCYTES # BLD AUTO: 0.03 THOUSAND/UL (ref 0–0.2)
IMM GRANULOCYTES NFR BLD AUTO: 0 % (ref 0–2)
LIPASE SERPL-CCNC: 16 U/L (ref 11–82)
LYMPHOCYTES # BLD AUTO: 3.09 THOUSANDS/ÂΜL (ref 0.6–4.47)
LYMPHOCYTES NFR BLD AUTO: 30 % (ref 14–44)
MCH RBC QN AUTO: 27.3 PG (ref 26.8–34.3)
MCHC RBC AUTO-ENTMCNC: 32 G/DL (ref 31.4–37.4)
MCV RBC AUTO: 85 FL (ref 82–98)
MONOCYTES # BLD AUTO: 0.79 THOUSAND/ÂΜL (ref 0.17–1.22)
MONOCYTES NFR BLD AUTO: 8 % (ref 4–12)
NEUTROPHILS # BLD AUTO: 5.73 THOUSANDS/ÂΜL (ref 1.85–7.62)
NEUTS SEG NFR BLD AUTO: 56 % (ref 43–75)
NRBC BLD AUTO-RTO: 0 /100 WBCS
PLATELET # BLD AUTO: 325 THOUSANDS/UL (ref 149–390)
PMV BLD AUTO: 11 FL (ref 8.9–12.7)
POTASSIUM SERPL-SCNC: 3.8 MMOL/L (ref 3.5–5.3)
PROT SERPL-MCNC: 7.6 G/DL (ref 6.4–8.4)
RBC # BLD AUTO: 5.02 MILLION/UL (ref 3.88–5.62)
SODIUM SERPL-SCNC: 138 MMOL/L (ref 135–147)
WBC # BLD AUTO: 10.23 THOUSAND/UL (ref 4.31–10.16)

## 2023-05-18 RX ORDER — CLINDAMYCIN HYDROCHLORIDE 150 MG/1
450 CAPSULE ORAL ONCE
Status: COMPLETED | OUTPATIENT
Start: 2023-05-18 | End: 2023-05-18

## 2023-05-18 RX ORDER — CLINDAMYCIN HYDROCHLORIDE 150 MG/1
450 CAPSULE ORAL 3 TIMES DAILY
Qty: 45 CAPSULE | Refills: 0 | Status: SHIPPED | OUTPATIENT
Start: 2023-05-18 | End: 2023-05-23

## 2023-05-18 RX ORDER — KETOROLAC TROMETHAMINE 30 MG/ML
15 INJECTION, SOLUTION INTRAMUSCULAR; INTRAVENOUS ONCE
Status: COMPLETED | OUTPATIENT
Start: 2023-05-18 | End: 2023-05-18

## 2023-05-18 RX ADMIN — IOHEXOL 100 ML: 350 INJECTION, SOLUTION INTRAVENOUS at 03:42

## 2023-05-18 RX ADMIN — KETOROLAC TROMETHAMINE 15 MG: 30 INJECTION, SOLUTION INTRAMUSCULAR; INTRAVENOUS at 02:48

## 2023-05-18 RX ADMIN — SODIUM CHLORIDE 1000 ML: 0.9 INJECTION, SOLUTION INTRAVENOUS at 02:50

## 2023-05-18 RX ADMIN — CLINDAMYCIN HYDROCHLORIDE 450 MG: 150 CAPSULE ORAL at 04:59

## 2023-05-18 NOTE — ED NOTES
Pt instructed of wait time  MD notified of patient asking for water       Charlotte Cabello RN  05/18/23 5815

## 2023-05-18 NOTE — ED PROVIDER NOTES
History  Chief Complaint   Patient presents with   • Bleeding/Bruising     Pt c/o of bleeding form his belly button  35 yo M presents to ED with abd pain  Noticed some blood around his umbilicus today, and central abd pain, so came in  He noticed the blood on his belly button after doing yard work  No known injury  H/o hernia repair [place] 2 yrs ago  No other surgeries or medical issues  No N/V or diarrhea  No smoking/drinking/drugs  No fevers  Sharp intermittent pain  Moderate  All started this evening  Has urinary frequency chronically - follows with urology  This is not a new issue  History provided by:  Patient and medical records   used: No    Abdominal Pain  Pain location:  Periumbilical  Pain quality: sharp    Pain radiates to:  Does not radiate  Onset quality:  Gradual  Timing:  Constant  Progression:  Unchanged  Chronicity:  New  Context: previous surgery    Context: not alcohol use, not recent travel, not suspicious food intake and not trauma    Relieved by:  None tried  Worsened by:  Palpation  Ineffective treatments:  None tried  Associated symptoms: no chest pain, no chills, no constipation, no cough, no diarrhea, no fatigue, no fever, no hematemesis, no hematochezia, no hematuria, no nausea, no shortness of breath, no sore throat and no vomiting    Risk factors: no alcohol abuse        Prior to Admission Medications   Prescriptions Last Dose Informant Patient Reported? Taking? Omeprazole 20 MG TBDD   No No   Sig: Take 20 mg by mouth in the morning   naproxen (NAPROSYN) 500 mg tablet   Yes No   Sig: Take 500 mg by mouth      Facility-Administered Medications: None       Past Medical History:   Diagnosis Date   • Hernia, abdominal    • Sleep apnea        Past Surgical History:   Procedure Laterality Date   • HERNIA REPAIR         History reviewed  No pertinent family history  I have reviewed and agree with the history as documented      E-Cigarette/Vaping   • E-Cigarette Use Never User      E-Cigarette/Vaping Substances   • Nicotine No    • THC No    • CBD No    • Flavoring No    • Other No    • Unknown No      Social History     Tobacco Use   • Smoking status: Former     Types: Cigarettes   • Smokeless tobacco: Never   Vaping Use   • Vaping Use: Never used   Substance Use Topics   • Alcohol use: Yes     Comment: rare   • Drug use: Never       Review of Systems   Constitutional: Negative for chills, diaphoresis, fatigue, fever and unexpected weight change  HENT: Negative for congestion, ear pain, rhinorrhea, sore throat, trouble swallowing and voice change  Eyes: Negative for pain and visual disturbance  Respiratory: Negative for cough, chest tightness and shortness of breath  Cardiovascular: Negative for chest pain, palpitations and leg swelling  Gastrointestinal: Positive for abdominal pain  Negative for blood in stool, constipation, diarrhea, hematemesis, hematochezia, nausea and vomiting  Genitourinary: Negative for difficulty urinating and hematuria  Musculoskeletal: Negative for arthralgias, back pain and neck pain  Skin: Positive for wound  Negative for rash  Neurological: Negative for dizziness, syncope, light-headedness and headaches  Psychiatric/Behavioral: Negative for confusion and suicidal ideas  The patient is not nervous/anxious  Physical Exam  Physical Exam  Vitals and nursing note reviewed  Constitutional:       General: He is not in acute distress  Appearance: He is well-developed  He is obese  He is not ill-appearing or diaphoretic  HENT:      Head: Normocephalic and atraumatic  Right Ear: External ear normal       Left Ear: External ear normal       Nose: Nose normal       Mouth/Throat:      Mouth: Mucous membranes are moist       Pharynx: Oropharynx is clear  Eyes:      General: No scleral icterus  Right eye: No discharge  Left eye: No discharge        Extraocular Movements: Extraocular movements intact  Conjunctiva/sclera: Conjunctivae normal       Pupils: Pupils are equal, round, and reactive to light  Neck:      Vascular: No JVD  Trachea: No tracheal deviation  Cardiovascular:      Rate and Rhythm: Normal rate and regular rhythm  Heart sounds: Normal heart sounds  No murmur heard  No friction rub  No gallop  Pulmonary:      Effort: Pulmonary effort is normal  No respiratory distress  Breath sounds: Normal breath sounds  No stridor  No wheezing or rales  Chest:      Chest wall: No tenderness  Abdominal:      General: Bowel sounds are normal  There is no distension  Palpations: Abdomen is soft  Tenderness: There is abdominal tenderness (periumbilical  scant dried blood around umbilicus  no wound noted  )  There is no right CVA tenderness, left CVA tenderness, guarding or rebound  Hernia: No hernia is present  Musculoskeletal:         General: No tenderness or deformity  Normal range of motion  Cervical back: Normal range of motion and neck supple  Lymphadenopathy:      Cervical: No cervical adenopathy  Skin:     General: Skin is warm and dry  Findings: No rash  Neurological:      General: No focal deficit present  Mental Status: He is alert and oriented to person, place, and time  Cranial Nerves: No cranial nerve deficit  Sensory: No sensory deficit        Coordination: Coordination normal    Psychiatric:         Behavior: Behavior normal              Vital Signs  ED Triage Vitals [05/17/23 2334]   Temperature Pulse Respirations Blood Pressure SpO2   97 8 °F (36 6 °C) 83 20 130/91 99 %      Temp Source Heart Rate Source Patient Position - Orthostatic VS BP Location FiO2 (%)   Oral Monitor Sitting Right arm --      Pain Score       3           Vitals:    05/17/23 2334   BP: 130/91   Pulse: 83   Patient Position - Orthostatic VS: Sitting         Visual Acuity      ED Medications  Medications   clindamycin (CLEOCIN) capsule 450 mg (has no administration in time range)   sodium chloride 0 9 % bolus 1,000 mL (1,000 mL Intravenous New Bag 5/18/23 0250)   ketorolac (TORADOL) injection 15 mg (15 mg Intravenous Given 5/18/23 0248)   iohexol (OMNIPAQUE) 350 MG/ML injection (SINGLE-DOSE) 100 mL (100 mL Intravenous Given 5/18/23 0342)       Diagnostic Studies  Results Reviewed     Procedure Component Value Units Date/Time    Comprehensive metabolic panel [[id number]]  (Abnormal) Collected: 05/18/23 0245    Lab Status: Final result Specimen: Blood from Arm, Left Updated: 05/18/23 0305     Sodium 138 mmol/L      Potassium 3 8 mmol/L      Chloride 104 mmol/L      CO2 29 mmol/L      ANION GAP 5 mmol/L      BUN 16 mg/dL      Creatinine 0 81 mg/dL      Glucose 109 mg/dL      Calcium 9 4 mg/dL      AST 19 U/L      ALT 15 U/L      Alkaline Phosphatase 142 U/L      Total Protein 7 6 g/dL      Albumin 4 1 g/dL      Total Bilirubin 0 73 mg/dL      eGFR 122 ml/min/1 73sq m     Narrative:      [facility] guidelines for Chronic Kidney Disease (CKD):   •  Stage 1 with normal or high GFR (GFR > 90 mL/min/1 73 square meters)  •  Stage 2 Mild CKD (GFR = 60-89 mL/min/1 73 square meters)  •  Stage 3A Moderate CKD (GFR = 45-59 mL/min/1 73 square meters)  •  Stage 3B Moderate CKD (GFR = 30-44 mL/min/1 73 square meters)  •  Stage 4 Severe CKD (GFR = 15-29 mL/min/1 73 square meters)  •  Stage 5 End Stage CKD (GFR <15 mL/min/1 73 square meters)  Note: GFR calculation is accurate only with a steady state creatinine    Lipase [[id number]]  (Normal) Collected: 05/18/23 0245    Lab Status: Final result Specimen: Blood from Arm, Left Updated: 05/18/23 0305     Lipase 16 u/L     CBC and differential [[id number]]  (Abnormal) Collected: 05/18/23 0245    Lab Status: Final result Specimen: Blood from Arm, Left Updated: 05/18/23 0251     WBC 10 23 Thousand/uL      RBC 5 02 Million/uL      Hemoglobin 13 7 g/dL      Hematocrit 42 8 %      MCV 85 fL MCH 27 3 pg      MCHC 32 0 g/dL      RDW 13 7 %      MPV 11 0 fL      Platelets 085 Thousands/uL      nRBC 0 /100 WBCs      Neutrophils Relative 56 %      Immat GRANS % 0 %      Lymphocytes Relative 30 %      Monocytes Relative 8 %      Eosinophils Relative 5 %      Basophils Relative 1 %      Neutrophils Absolute 5 73 Thousands/µL      Immature Grans Absolute 0 03 Thousand/uL      Lymphocytes Absolute 3 09 Thousands/µL      Monocytes Absolute 0 79 Thousand/µL      Eosinophils Absolute 0 51 Thousand/µL      Basophils Absolute 0 08 Thousands/µL                  CT abdomen pelvis with contrast   Final Result by Manuel Shetty MD (05/18 0408)      There is some infiltration of the subcutaneous fat immediately inferior to the umbilicus  This appears more pronounced compared with November 5, 2022  Clinical correlation recommended  No bowel obstruction  Normal appendix  Borderline hepatomegaly  Mild splenomegaly  Other nonemergent findings as above  Workstation performed: [id number]                    Procedures  Procedures         ED Course  ED Course as of 05/18/23 0457   Thu May 18, 2023   0434 CT noted  Will discuss w/ surgery  Images in media  No repeat bleeding, no wound  4185 Discussed w/ surgery  Mild recurrence umbilical hernia  No bowel involved  No acute intervention required  Given leukocytosis, and subcutaneous fat infiltration, will treat as though early cellulitis  Will have him f/u with his PCP, surgeon  RTED if sx worsen  Pt does feel improved after toradol  Medical Decision Making  Cellulitis: acute illness or injury  Leukocytosis: acute illness or injury  Umbilical hernia: acute illness or injury  Amount and/or Complexity of Data Reviewed  Labs: ordered  Decision-making details documented in ED Course  Radiology: ordered  Risk  Prescription drug management            Disposition  Final diagnoses:   Umbilical hernia Leukocytosis   Cellulitis   Splenomegaly   Hepatomegaly     Time reflects when diagnosis was documented in both MDM as applicable and the Disposition within this note     Time User Action Codes Description Comment    5/18/2023  4:54 AM Conor Lamp S Add [R28 3] Umbilical hernia     4/80/1506  4:54 AM Leatha Hoonah Add [N06 142] Leukocytosis     5/18/2023  4:54 AM Conor Lamp S Add [L03 90] Cellulitis     5/18/2023  4:57 AM Conor Lamp S Add [R16 1] Splenomegaly     5/18/2023  4:57 AM Leatha Hoonah Add [R16 0] Hepatomegaly       ED Disposition     ED Disposition   Discharge    Condition   Stable    Date/Time   Thu May 18, 2023  4:54 AM    Comment   Vargas Givens discharge to home/self care                 Follow-up Information     Follow up With Specialties Details Why Contact Info Additional Information    Ariane Nichols MD  Schedule an appointment as soon as possible for a visit   [facility]  [address] Darleen Manzanares [address] Emergency Department Emergency Medicine  If symptoms worsen [address] [id number]  [address] [facility] Emergency Department, [address] [facility], [facility], Rona Delroy 10    Follow up with your surgeon  Schedule an appointment as soon as possible for a visit        [address] [facility] Surgery  As needed [address] [place] [id number]  [place], [address], [address], [place], [place], [address]          Patient's Medications   Discharge Prescriptions    CLINDAMYCIN (CLEOCIN) 150 MG CAPSULE    Take 3 capsules (450 mg total) by mouth 3 (three) times a day for 5 days       Start Date: 5/18/2023 End Date: 5/23/2023       Order Dose: 450 mg       Quantity: 45 capsule    Refills: 0       No discharge procedures on file     PDMP Review     None          ED Provider  Electronically Signed by           Shawna Almtan MD  05/18/23 4894

## 2024-02-10 ENCOUNTER — HOSPITAL ENCOUNTER (EMERGENCY)
Facility: HOSPITAL | Age: 29
Discharge: HOME | End: 2024-02-10
Attending: EMERGENCY MEDICINE
Payer: COMMERCIAL

## 2024-02-10 VITALS
SYSTOLIC BLOOD PRESSURE: 135 MMHG | OXYGEN SATURATION: 97 % | RESPIRATION RATE: 16 BRPM | DIASTOLIC BLOOD PRESSURE: 80 MMHG | HEART RATE: 79 BPM | TEMPERATURE: 97.7 F

## 2024-02-10 DIAGNOSIS — K02.9 PAIN DUE TO DENTAL CARIES: Primary | ICD-10-CM

## 2024-02-10 DIAGNOSIS — K08.89 TOOTHACHE: ICD-10-CM

## 2024-02-10 PROCEDURE — 64450 NJX AA&/STRD OTHER PN/BRANCH: CPT | Performed by: EMERGENCY MEDICINE

## 2024-02-10 PROCEDURE — 99284 EMERGENCY DEPT VISIT MOD MDM: CPT | Performed by: EMERGENCY MEDICINE

## 2024-02-10 PROCEDURE — 99282 EMERGENCY DEPT VISIT SF MDM: CPT

## 2024-02-10 RX ORDER — BUPIVACAINE HYDROCHLORIDE AND EPINEPHRINE 5; 5 MG/ML; UG/ML
1.8 INJECTION, SOLUTION EPIDURAL; INTRACAUDAL; PERINEURAL ONCE
Status: COMPLETED | OUTPATIENT
Start: 2024-02-10 | End: 2024-02-10

## 2024-02-10 RX ADMIN — BUPIVACAINE HYDROCHLORIDE AND EPINEPHRINE BITARTRATE 1.8 ML: 5; .005 INJECTION, SOLUTION SUBCUTANEOUS at 14:03

## 2024-02-10 NOTE — DISCHARGE INSTRUCTIONS
You should take 3 over-the-counter ibuprofen tablets with food every 6 hours as needed for pain.  Do not do this for more than 5 days without following up with your primary care provider

## 2024-02-10 NOTE — ED PROVIDER NOTES
"History  Chief Complaint   Patient presents with    Dental Pain     Pt reports after eating tacos x4 days ago. Pt reports swelling and pain to right side of mouth. Pt reports top tooth feels sharp \"I don't know if I cracked it or not\"     28-year-old male presents for evaluation of a right upper molar tooth ache that has been ongoing for the past 3 days after the patient was eating tacos and felt something crack.      Dental Pain      Prior to Admission Medications   Prescriptions Last Dose Informant Patient Reported? Taking?   Omeprazole 20 MG TBDD   No No   Sig: Take 20 mg by mouth in the morning   naproxen (NAPROSYN) 500 mg tablet   Yes No   Sig: Take 500 mg by mouth      Facility-Administered Medications: None       Past Medical History:   Diagnosis Date    Hernia, abdominal     Sleep apnea        Past Surgical History:   Procedure Laterality Date    HERNIA REPAIR         History reviewed. No pertinent family history.  I have reviewed and agree with the history as documented.    E-Cigarette/Vaping    E-Cigarette Use Never User      E-Cigarette/Vaping Substances    Nicotine No     THC No     CBD No     Flavoring No     Other No     Unknown No      Social History     Tobacco Use    Smoking status: Former     Types: Cigarettes    Smokeless tobacco: Never   Vaping Use    Vaping status: Never Used   Substance Use Topics    Alcohol use: Yes     Comment: rare    Drug use: Never       Review of Systems    Physical Exam  Physical Exam  Vitals and nursing note reviewed.   Constitutional:       General: He is not in acute distress.     Appearance: He is well-developed. He is obese.   HENT:      Head: Normocephalic and atraumatic.      Right Ear: Tympanic membrane and external ear normal.      Left Ear: External ear normal.      Mouth/Throat:      Mouth: Mucous membranes are moist. No oral lesions or angioedema.      Dentition: Dental caries present. No gingival swelling or dental abscesses.      Tongue: No lesions.   Eyes: "      General: No scleral icterus.  Pulmonary:      Effort: Pulmonary effort is normal. No respiratory distress.   Abdominal:      General: There is no distension.   Musculoskeletal:         General: Normal range of motion.      Cervical back: Normal range of motion.   Skin:     Findings: No rash.   Neurological:      Mental Status: He is alert. Mental status is at baseline.   Psychiatric:         Mood and Affect: Mood normal.         Vital Signs  ED Triage Vitals [02/10/24 1343]   Temperature Pulse Respirations Blood Pressure SpO2   97.7 °F (36.5 °C) 79 16 135/80 97 %      Temp Source Heart Rate Source Patient Position - Orthostatic VS BP Location FiO2 (%)   Oral Monitor Sitting Left arm --      Pain Score       --           Vitals:    02/10/24 1343   BP: 135/80   Pulse: 79   Patient Position - Orthostatic VS: Sitting         Visual Acuity      ED Medications  Medications   bupivacaine-epinephrine (PF) (MARCAINE/EPINEPHRINE PF) 0.5 %-1:401774 injection 1.8 mL (1.8 mL Infiltration Given by Other 2/10/24 1403)       Diagnostic Studies  Results Reviewed       None                   No orders to display              Procedures  Nerve block    Date/Time: 2/10/2024 2:14 PM    Performed by: Brian Dudley DO  Authorized by: Brian Dudley DO    Patient location:  ED  [place] Protocol:  Consent: Verbal consent obtained.  Risks and benefits: risks, benefits and alternatives were discussed  Consent given by: patient  Patient understanding: patient states understanding of the procedure being performed  Patient identity confirmed: verbally with patient    Indications:     Indications:  Pain relief  Location:     Body area:  Head    Head nerve blocked: infraorbital.    Laterality:  Right  Skin anesthesia (see MAR for exact dosages):     Skin anesthesia method:  None  Procedure details (see MAR for exact dosages):     Needle gauge: 23G.    Block anesthetic: 1.8ml vivacaine.    Injection procedure:  Anatomic landmarks  identified, anatomic landmarks palpated, incremental injection, introduced needle and negative aspiration for blood  Post-procedure details:     Outcome:  Anesthesia achieved    Patient tolerance of procedure:  Tolerated well, no immediate complications           ED Course                               SBIRT 22yo+      Flowsheet Row Most Recent Value   Initial Alcohol Screen: US AUDIT-C     1. How often do you have a drink containing alcohol? 0 Filed at: 02/10/2024 1400   2. How many drinks containing alcohol do you have on a typical day you are drinking?  0 Filed at: 02/10/2024 1400   3a. Male UNDER 65: How often do you have five or more drinks on one occasion? 0 Filed at: 02/10/2024 1400   Audit-C Score 0 Filed at: 02/10/2024 1400   BRIONNA: How many times in the past year have you...    Used an illegal drug or used a prescription medication for non-medical reasons? Never Filed at: 02/10/2024 1400                      Medical Decision Making  Patient without signs of dental abscess facial abscess or gingival infection.  Patient has pain from broken tooth.  Discussed pain control with dental block and continued use of NSAIDs until patient can follow-up with with dentistry Monday as previously scheduled.    The patient (and any family present) verbalized understanding of the discharge instructions and warnings that would necessitate return to the Emergency Department.    All questions were answered prior to discharge.    Risk  Prescription drug management.             Disposition  Final diagnoses:   Pain due to dental caries   Toothache     Time reflects when diagnosis was documented in both MDM as applicable and the Disposition within this note       Time User Action Codes Description Comment    2/10/2024  2:13 PM Brian Dudley Add [K02.9] Pain due to dental caries     2/10/2024  2:13 PM Brian Dudley Add [K08.89] Toothache           ED Disposition       ED Disposition   Discharge    Condition   Stable     Date/Time   Sat Feb 10, 2024 1413    Comment   Ramiro Hutchins discharge to home/self care.                   Follow-up Information       Follow up With Specialties Details Why Contact Info    your dentist  On 2/12/2024 For further evaluation, as previously scheduled             Discharge Medication List as of 2/10/2024  2:15 PM        CONTINUE these medications which have NOT CHANGED    Details   naproxen (NAPROSYN) 500 mg tablet Take 500 mg by mouth, Starting Thu 2/24/2022, Historical Med      Omeprazole 20 MG TBDD Take 20 mg by mouth in the morning, Starting Sat 11/5/2022, Normal             No discharge procedures on file.    PDMP Review       None            ED Provider  Electronically Signed by             Brian Dudley,   02/10/24 7652